# Patient Record
Sex: MALE | Race: BLACK OR AFRICAN AMERICAN | NOT HISPANIC OR LATINO | Employment: OTHER | URBAN - METROPOLITAN AREA
[De-identification: names, ages, dates, MRNs, and addresses within clinical notes are randomized per-mention and may not be internally consistent; named-entity substitution may affect disease eponyms.]

---

## 2019-12-31 PROBLEM — I10 ESSENTIAL HYPERTENSION: Status: ACTIVE | Noted: 2019-12-31

## 2019-12-31 PROBLEM — J44.9 COPD (CHRONIC OBSTRUCTIVE PULMONARY DISEASE): Status: ACTIVE | Noted: 2019-12-31

## 2019-12-31 PROBLEM — I50.22 CHRONIC SYSTOLIC CONGESTIVE HEART FAILURE: Status: ACTIVE | Noted: 2019-12-31

## 2020-03-16 PROBLEM — I50.9 ACUTE ON CHRONIC CONGESTIVE HEART FAILURE: Status: ACTIVE | Noted: 2020-03-16

## 2020-03-16 PROBLEM — B20 HIV DISEASE: Status: ACTIVE | Noted: 2020-03-16

## 2020-03-16 PROBLEM — R18.8 ASCITES: Status: ACTIVE | Noted: 2020-03-16

## 2020-12-06 PROBLEM — I26.94 MULTIPLE SUBSEGMENTAL PULMONARY EMBOLI WITHOUT ACUTE COR PULMONALE: Status: ACTIVE | Noted: 2020-12-06

## 2021-01-06 PROBLEM — Z95.810 ICD (IMPLANTABLE CARDIOVERTER-DEFIBRILLATOR), SINGLE, IN SITU: Status: ACTIVE | Noted: 2021-01-06

## 2021-01-06 PROBLEM — I50.9 ACUTE ON CHRONIC CONGESTIVE HEART FAILURE: Status: RESOLVED | Noted: 2020-03-16 | Resolved: 2021-01-06

## 2021-01-11 PROBLEM — Z91.148 NONCOMPLIANCE WITH MEDICATION REGIMEN: Status: ACTIVE | Noted: 2021-01-11

## 2021-01-21 PROBLEM — R00.0 TACHYCARDIA: Status: ACTIVE | Noted: 2021-01-21

## 2021-06-14 PROBLEM — R77.9 ELEVATED BLOOD PROTEIN: Status: ACTIVE | Noted: 2021-06-14

## 2021-06-21 PROBLEM — I27.20 PULMONARY HYPERTENSION: Status: ACTIVE | Noted: 2021-06-21

## 2021-06-21 PROBLEM — I34.0 NONRHEUMATIC MITRAL VALVE REGURGITATION: Status: ACTIVE | Noted: 2021-06-21

## 2021-07-06 PROBLEM — Z78.9 ALCOHOL USE: Status: ACTIVE | Noted: 2021-07-06

## 2021-09-10 PROBLEM — N52.1 ERECTILE DYSFUNCTION DUE TO DISEASES CLASSIFIED ELSEWHERE: Status: ACTIVE | Noted: 2021-09-10

## 2021-11-23 PROBLEM — Z87.891 FORMER SMOKER: Status: ACTIVE | Noted: 2021-11-23

## 2021-11-23 PROBLEM — R91.8 MULTIPLE LUNG NODULES ON CT: Status: ACTIVE | Noted: 2021-11-23

## 2021-12-10 PROBLEM — K92.0 HEMATEMESIS WITH NAUSEA: Status: ACTIVE | Noted: 2021-12-10

## 2022-01-27 PROBLEM — G47.33 OBSTRUCTIVE SLEEP APNEA: Status: ACTIVE | Noted: 2022-01-27

## 2022-02-25 PROBLEM — R45.89 DEPRESSED MOOD: Status: ACTIVE | Noted: 2022-02-25

## 2022-03-11 PROBLEM — E78.2 MIXED HYPERLIPIDEMIA: Status: ACTIVE | Noted: 2022-03-11

## 2022-04-20 PROBLEM — G47.01 INSOMNIA DUE TO MEDICAL CONDITION: Status: ACTIVE | Noted: 2022-04-20

## 2022-06-02 PROBLEM — F32.9 MAJOR DEPRESSIVE DISORDER: Status: ACTIVE | Noted: 2022-06-02

## 2022-06-30 PROBLEM — Z87.898 HISTORY OF ALCOHOL USE DISORDER: Chronic | Status: ACTIVE | Noted: 2021-07-06

## 2022-06-30 PROBLEM — F12.11 HISTORY OF CANNABIS ABUSE: Status: ACTIVE | Noted: 2022-06-30

## 2022-06-30 PROBLEM — B20 HIV DISEASE: Chronic | Status: ACTIVE | Noted: 2020-03-16

## 2022-06-30 PROBLEM — I50.22 CHRONIC SYSTOLIC CONGESTIVE HEART FAILURE: Chronic | Status: ACTIVE | Noted: 2019-12-31

## 2022-06-30 PROBLEM — Z87.898 HISTORY OF ALCOHOL USE DISORDER: Status: ACTIVE | Noted: 2021-07-06

## 2022-06-30 PROBLEM — J44.9 COPD (CHRONIC OBSTRUCTIVE PULMONARY DISEASE): Chronic | Status: ACTIVE | Noted: 2019-12-31

## 2022-06-30 PROBLEM — F33.3 MDD (MAJOR DEPRESSIVE DISORDER), RECURRENT, SEVERE, WITH PSYCHOSIS: Status: ACTIVE | Noted: 2022-06-02

## 2022-06-30 PROBLEM — F43.10 PTSD (POST-TRAUMATIC STRESS DISORDER): Status: ACTIVE | Noted: 2022-06-30

## 2022-06-30 PROBLEM — F33.3 MDD (MAJOR DEPRESSIVE DISORDER), RECURRENT, SEVERE, WITH PSYCHOSIS: Chronic | Status: ACTIVE | Noted: 2022-06-02

## 2022-06-30 PROBLEM — F12.11 HISTORY OF CANNABIS ABUSE: Chronic | Status: ACTIVE | Noted: 2022-06-30

## 2022-06-30 PROBLEM — F43.10 PTSD (POST-TRAUMATIC STRESS DISORDER): Chronic | Status: ACTIVE | Noted: 2022-06-30

## 2022-07-05 ENCOUNTER — OUTPATIENT CASE MANAGEMENT (OUTPATIENT)
Dept: ADMINISTRATIVE | Facility: OTHER | Age: 47
End: 2022-07-05
Payer: MEDICARE

## 2022-07-06 PROBLEM — K85.20 ALCOHOL-INDUCED ACUTE PANCREATITIS: Status: ACTIVE | Noted: 2022-07-06

## 2022-07-06 PROBLEM — N18.2 ACUTE RENAL FAILURE SUPERIMPOSED ON STAGE 2 CHRONIC KIDNEY DISEASE: Status: ACTIVE | Noted: 2022-07-06

## 2022-07-06 PROBLEM — E87.8 HYPOCHLOREMIA: Status: ACTIVE | Noted: 2022-07-06

## 2022-07-06 PROBLEM — E87.1 HYPONATREMIA: Status: ACTIVE | Noted: 2022-07-06

## 2022-07-06 PROBLEM — E87.5 HYPERKALEMIA: Status: ACTIVE | Noted: 2022-07-06

## 2022-07-06 PROBLEM — N17.9 ACUTE RENAL FAILURE SUPERIMPOSED ON STAGE 2 CHRONIC KIDNEY DISEASE: Status: ACTIVE | Noted: 2022-07-06

## 2022-07-07 PROBLEM — Z86.711 HISTORY OF PULMONARY EMBOLISM: Status: ACTIVE | Noted: 2022-07-07

## 2022-07-07 PROBLEM — R74.01 TRANSAMINITIS: Status: ACTIVE | Noted: 2022-07-07

## 2022-07-07 PROBLEM — I47.20 VENTRICULAR TACHYCARDIA: Status: ACTIVE | Noted: 2021-01-21

## 2022-07-08 PROBLEM — E87.5 HYPERKALEMIA: Status: RESOLVED | Noted: 2022-07-06 | Resolved: 2022-07-08

## 2022-07-09 PROBLEM — I50.42 CHRONIC COMBINED SYSTOLIC AND DIASTOLIC HEART FAILURE: Status: ACTIVE | Noted: 2019-12-31

## 2022-07-13 PROBLEM — E83.42 HYPOMAGNESEMIA: Status: ACTIVE | Noted: 2022-07-13

## 2022-08-09 PROBLEM — N18.2 CKD (CHRONIC KIDNEY DISEASE) STAGE 2, GFR 60-89 ML/MIN: Status: ACTIVE | Noted: 2022-08-09

## 2022-09-29 PROBLEM — F12.90 CANNABIS USE, UNCOMPLICATED: Status: ACTIVE | Noted: 2022-06-30

## 2022-11-14 PROBLEM — N17.9 ACUTE RENAL FAILURE: Status: RESOLVED | Noted: 2022-07-06 | Resolved: 2022-11-14

## 2022-11-16 PROBLEM — R94.31 PROLONGED QT INTERVAL: Status: ACTIVE | Noted: 2022-11-16

## 2023-03-20 PROBLEM — Z79.899 ON AMIODARONE THERAPY: Status: ACTIVE | Noted: 2023-03-20

## 2023-04-13 PROBLEM — R11.2 NAUSEA AND VOMITING: Status: ACTIVE | Noted: 2023-04-13

## 2023-04-13 PROBLEM — E87.6 HYPOKALEMIA: Status: ACTIVE | Noted: 2023-04-13

## 2023-06-17 PROBLEM — Z00.00 HEALTHCARE MAINTENANCE: Status: ACTIVE | Noted: 2023-06-17

## 2023-06-26 ENCOUNTER — PATIENT MESSAGE (OUTPATIENT)
Dept: ADMINISTRATIVE | Facility: OTHER | Age: 48
End: 2023-06-26
Payer: MEDICARE

## 2023-06-26 ENCOUNTER — SOCIAL WORK (OUTPATIENT)
Dept: ADMINISTRATIVE | Facility: OTHER | Age: 48
End: 2023-06-26
Payer: MEDICARE

## 2023-06-26 NOTE — PROGRESS NOTES
Sw received a message to contact Patient regarding one of his medicines. Sw called Patient ([phone]). A voice message was left requesting he call back.    Luma Mathew LCSW    [phone]

## 2023-06-27 ENCOUNTER — SOCIAL WORK (OUTPATIENT)
Dept: ADMINISTRATIVE | Facility: OTHER | Age: 48
End: 2023-06-27
Payer: MEDICARE

## 2023-06-27 NOTE — PROGRESS NOTES
Sw called [facility] ([phone]) and spoke to the pharmacist regarding Patient's medicines. She reports Patient's medicines are ready for pick-up. Faye called and spoke to Patient ([phone]). Sw explained his medicines are ready for pick-up at the pharmacy. He verbalized appreciation.    Luma Mathew LCSW    [phone]

## 2023-07-27 PROBLEM — K21.9 GERD (GASTROESOPHAGEAL REFLUX DISEASE): Status: ACTIVE | Noted: 2023-07-27

## 2023-08-08 PROBLEM — Z87.891 PERSONAL HISTORY OF TOBACCO USE, PRESENTING HAZARDS TO HEALTH: Chronic | Status: ACTIVE | Noted: 2023-08-08

## 2023-09-18 PROBLEM — Z00.00 HEALTHCARE MAINTENANCE: Status: RESOLVED | Noted: 2023-06-17 | Resolved: 2023-09-18

## 2023-12-13 PROBLEM — E87.20 METABOLIC ACIDOSIS: Status: ACTIVE | Noted: 2023-12-13
